# Patient Record
Sex: MALE | URBAN - METROPOLITAN AREA
[De-identification: names, ages, dates, MRNs, and addresses within clinical notes are randomized per-mention and may not be internally consistent; named-entity substitution may affect disease eponyms.]

---
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
## 2019-10-11 ENCOUNTER — OFFICE VISIT (OUTPATIENT)
Dept: RHEUMATOLOGY | Facility: CLINIC | Age: 19
End: 2019-10-11
Attending: PEDIATRICS
Payer: COMMERCIAL

## 2019-10-11 ENCOUNTER — HOSPITAL ENCOUNTER (OUTPATIENT)
Dept: GENERAL RADIOLOGY | Facility: CLINIC | Age: 19
End: 2019-10-11
Attending: PEDIATRICS
Payer: COMMERCIAL

## 2019-10-11 VITALS
DIASTOLIC BLOOD PRESSURE: 76 MMHG | SYSTOLIC BLOOD PRESSURE: 116 MMHG | HEIGHT: 68 IN | TEMPERATURE: 98.1 F | WEIGHT: 158.73 LBS | HEART RATE: 97 BPM | BODY MASS INDEX: 24.06 KG/M2

## 2019-10-11 DIAGNOSIS — M25.561 ACUTE PAIN OF BOTH KNEES: ICD-10-CM

## 2019-10-11 DIAGNOSIS — M25.562 ACUTE PAIN OF BOTH KNEES: ICD-10-CM

## 2019-10-11 DIAGNOSIS — M08.80 JIA (JUVENILE IDIOPATHIC ARTHRITIS) (H): Primary | ICD-10-CM

## 2019-10-11 DIAGNOSIS — M08.80 JIA (JUVENILE IDIOPATHIC ARTHRITIS) (H): ICD-10-CM

## 2019-10-11 PROCEDURE — 73560 X-RAY EXAM OF KNEE 1 OR 2: CPT | Mod: 50

## 2019-10-11 PROCEDURE — G0463 HOSPITAL OUTPT CLINIC VISIT: HCPCS | Mod: ZF

## 2019-10-11 ASSESSMENT — MIFFLIN-ST. JEOR: SCORE: 1711.25

## 2019-10-11 ASSESSMENT — PAIN SCALES - GENERAL: PAINLEVEL: NO PAIN (0)

## 2019-10-11 NOTE — PATIENT INSTRUCTIONS
"[facility] Pediatric Rheumatology      Dr. Fox    He may have an \"orthopedic\" problem is he has no swelling.     [phone]:  Listen for prompts: Rheumatology Nurse Coordinators:  Claudia Pacheco and Lina Dupree  can help with questions about your child s rheumatic condition, medications, and test results.      "

## 2019-10-11 NOTE — NURSING NOTE
"[facility] [[id number]]  Chief Complaint   Patient presents with     RECHECK     Rheum follow up     Initial /76   Pulse 97   Temp 98.1  F (36.7  C)   Ht 5' 8.11\" (173 cm)   Wt 158 lb 11.7 oz (72 kg)   BMI 24.06 kg/m   Estimated body mass index is 24.06 kg/m  as calculated from the following:    Height as of this encounter: 5' 8.11\" (173 cm).    Weight as of this encounter: 158 lb 11.7 oz (72 kg).  Medication Reconciliation: complete Julissa Corcoran LPN    "

## 2019-10-11 NOTE — PROGRESS NOTES
Patient Active Problem List   Diagnosis     JOEL (juvenile idiopathic arthritis) (H)          Subjective:     Robbie is a 18 year old male who was seen in Pediatric Rheumatology clinic today for a follow-up visit accompanied today by mother.  Robbie is being seen today for urgently requested follow-up for possible arthritis flare.  He was last seen in clinic January 2, 2018.  Prior to that he had been seen in October 2017 at which time he had steroid injections of his left elbow, right wrist and bilateral knees.  In the past his arthritis became under good control with only steroid injections and the family preferred no systemic medications.  At his follow-up visit in January 2018, approximately 20 months ago, his arthritis was clinically in remission.  I recommended follow-up if he has signs of arthritis again.    Today he tells me that he has pain in his knees, elbows and wrists.  His knees been hurting for 1 month generally all the time perhaps dating back 1 month prior to than that.  His elbows have on occasion but hard to move and his wrist hurts 1 time per week with certain movements.  He is noted no swelling in any of his joints.  He is limping because of his knee and that prompted the visit here.  He describes his pain at a level of 7 out of 10 in his knees with 30 minutes of stiffness most mornings and overall functioning at a level of 5 out of 10.  Review of 14 systems is negative other than noted above.      Allergies:     No Known Allergies       Medications:   None      Medical --  Family -- Social History:     Past Medical History:   Diagnosis Date     JOEL (juvenile idiopathic arthritis) (H)      Past Surgical History:   Procedure Laterality Date     INJECT STEROID (LOCATION) N/A 10/19/2017    Procedure: INJECT STEROID (LOCATION);  left elbow, right wrist, and bilateral knee injections. ;  Surgeon: Vi Fox MD;  Location: [facility]      right wrist cyst       No family history on  "file.  Social History     Patient does not qualify to have social determinant information on file (likely too young).   Social History Narrative    He is a  school for large equipment          Examination:     Blood pressure 116/76, pulse 97, temperature 98.1  F (36.7  C), height 1.73 m (5' 8.11\"), weight 72 kg (158 lb 11.7 oz).    Constitutional: alert, no distress and cooperative  Head and Eyes: No alopecia, PEERL, conjunctiva clear  ENT: mucous membranes moist, healthy appearing dentition, no intraoral ulcers and no intranasal ulcers  Neck: Neck supple. No lymphadenopathy. Thyroid symmetric, normal size,  Respiratory: negative, clear to auscultation  Cardiovascular: negative, RRR. No murmurs, no rubs  Gastrointestinal: Abdomen soft, non-tender., No masses, No hepatosplenomegaly  : Deferred  Neurologic: Gait normal. Reflexes normal and symmetric. Sensation grossly normal.  Psychiatric: mentation appears normal and affect normal  Hematologic/Lymphatic/Immunologic: Normal cervical, axillary lymph nodes  Skin: no rashes  Musculoskeletal: gait normal, extremities warm, well perfused, Detailed musculoskeletal exam was performed, normal muscle strength of trunk, upper and lower extremities and no sign of swelling, tenderness or decreased ROM unless otherwise noted. No tenderness at typical sites of enthesitis he may have a small effusion in the right knee but otherwise his examination is completely unremarkable. His gait is antalgic focused on the right leg.  He has no sign of ligamentous abnormalities on provocative testing.         Last Lab Results:     No visits with results within 2 Day(s) from this visit.   Latest known visit with results is:   Office Visit on 10/03/2017   Component Date Value     Lyme Disease Antibodies * 10/03/2017 0.03      WBC 10/03/2017 7.1      RBC Count 10/03/2017 5.10      Hemoglobin 10/03/2017 15.1      Hematocrit 10/03/2017 43.8      MCV 10/03/2017 86      MCH 10/03/2017 29.6  "     MCHC 10/03/2017 34.5      RDW 10/03/2017 12.1      Platelet Count 10/03/2017 166      Diff Method 10/03/2017 Automated Method      % Neutrophils 10/03/2017 57.3      % Lymphocytes 10/03/2017 31.0      % Monocytes 10/03/2017 6.5      % Eosinophils 10/03/2017 4.8      % Basophils 10/03/2017 0.3      % Immature Granulocytes 10/03/2017 0.1      Nucleated RBCs 10/03/2017 0      Absolute Neutrophil 10/03/2017 4.0      Absolute Lymphocytes 10/03/2017 2.2      Absolute Monocytes 10/03/2017 0.5      Absolute Eosinophils 10/03/2017 0.3      Absolute Basophils 10/03/2017 0.0      Abs Immature Granulocytes 10/03/2017 0.0      Absolute Nucleated RBC 10/03/2017 0.0      Sodium 10/03/2017 141      Potassium 10/03/2017 4.5      Chloride 10/03/2017 106      Carbon Dioxide 10/03/2017 29      Anion Gap 10/03/2017 6      Glucose 10/03/2017 88      Urea Nitrogen 10/03/2017 18      Creatinine 10/03/2017 0.89      GFR Estimate 10/03/2017 >90      GFR Estimate If Black 10/03/2017 >90      Calcium 10/03/2017 8.7*     Bilirubin Total 10/03/2017 0.3      Albumin 10/03/2017 4.0      Protein Total 10/03/2017 7.7      Alkaline Phosphatase 10/03/2017 69      ALT 10/03/2017 19      AST 10/03/2017 10      Rheumatoid Factor 10/03/2017 <20      Sed Rate 10/03/2017 3      TSH 10/03/2017 0.86           Assessment :      JOEL (juvenile idiopathic arthritis) (H)  Acute pain of both knees    Robbie has a long-standing history of juvenile idiopathic arthritis that has generally been responsive to steroid injections.  I am pleased to see that today he has no obvious evidence of synovitis accounting for his joint pains.  Since he has had his joint pains for over a month or so it should be reliable my examination should be reliable for arthritis since he had his discomfort for over a month and possibly longer.  I wonder about an anatomic abnormality such as an OCD lesion or cartilage lesion.  I recommend x-rays of his bilateral knees today.  If his  x-rays are normal I consider an MRI of his right knee given his limping and inability to function at work and school because of the pain.       Recommendations and follow-up:     1. X-rays of bilateral knees, consider MRI of right knee with contrast.  If symptoms worsen or swelling becomes more prominent I like to see him back for repeat examination.    2.   3. Return visit: No follow-ups on file.    If there are any new questions or concerns, I would be glad to help and can be reached through our main office at [phone] or our paging  at [phone].    Vi Fox MD, MS    I spent a total of 25  minutes face-to-face with Robbie Sexton during today's office visit.  Over 50% of this time was spent counseling the patient and/or coordinating care. See note for details.    CC  Patient Care Team:  Jez Garcia MD as PCP - General (Pediatrics)  Vi Fox MD (Pediatric Rheumatology)  Kory Del Valle LEONARD W    Copy to patient  Pretei SextonMed  [address] [facility] [address]

## 2019-10-11 NOTE — LETTER
10/11/2019      RE: Robbie Sexton  [id number] [facility] [address]       Patient Active Problem List   Diagnosis     JOEL (juvenile idiopathic arthritis) (H)          Subjective:     Robbie is a 18 year old male who was seen in Pediatric Rheumatology clinic today for a follow-up visit accompanied today by mother.  Robbie is being seen today for urgently requested follow-up for possible arthritis flare.  He was last seen in clinic January 2, 2018.  Prior to that he had been seen in October 2017 at which time he had steroid injections of his left elbow, right wrist and bilateral knees.  In the past his arthritis became under good control with only steroid injections and the family preferred no systemic medications.  At his follow-up visit in January 2018, approximately 20 months ago, his arthritis was clinically in remission.  I recommended follow-up if he has signs of arthritis again.    Today he tells me that he has pain in his knees, elbows and wrists.  His knees been hurting for 1 month generally all the time perhaps dating back 1 month prior to than that.  His elbows have on occasion but hard to move and his wrist hurts 1 time per week with certain movements.  He is noted no swelling in any of his joints.  He is limping because of his knee and that prompted the visit here.  He describes his pain at a level of 7 out of 10 in his knees with 30 minutes of stiffness most mornings and overall functioning at a level of 5 out of 10.  Review of 14 systems is negative other than noted above.      Allergies:     No Known Allergies       Medications:   None      Medical --  Family -- Social History:     Past Medical History:   Diagnosis Date     JOEL (juvenile idiopathic arthritis) (H)      Past Surgical History:   Procedure Laterality Date     INJECT STEROID (LOCATION) N/A 10/19/2017    Procedure: INJECT STEROID (LOCATION);  left elbow, right wrist, and bilateral knee injections. ;  Surgeon: Dominique  "Vi CHADWICK MD;  Location: [facility]      right wrist cyst       No family history on file.  Social History     Patient does not qualify to have social determinant information on file (likely too young).   Social History Narrative    He is a  school for large equipment          Examination:     Blood pressure 116/76, pulse 97, temperature 98.1  F (36.7  C), height 1.73 m (5' 8.11\"), weight 72 kg (158 lb 11.7 oz).    Constitutional: alert, no distress and cooperative  Head and Eyes: No alopecia, PEERL, conjunctiva clear  ENT: mucous membranes moist, healthy appearing dentition, no intraoral ulcers and no intranasal ulcers  Neck: Neck supple. No lymphadenopathy. Thyroid symmetric, normal size,  Respiratory: negative, clear to auscultation  Cardiovascular: negative, RRR. No murmurs, no rubs  Gastrointestinal: Abdomen soft, non-tender., No masses, No hepatosplenomegaly  : Deferred  Neurologic: Gait normal. Reflexes normal and symmetric. Sensation grossly normal.  Psychiatric: mentation appears normal and affect normal  Hematologic/Lymphatic/Immunologic: Normal cervical, axillary lymph nodes  Skin: no rashes  Musculoskeletal: gait normal, extremities warm, well perfused, Detailed musculoskeletal exam was performed, normal muscle strength of trunk, upper and lower extremities and no sign of swelling, tenderness or decreased ROM unless otherwise noted. No tenderness at typical sites of enthesitis he may have a small effusion in the right knee but otherwise his examination is completely unremarkable. His gait is antalgic focused on the right leg.  He has no sign of ligamentous abnormalities on provocative testing.         Last Lab Results:     No visits with results within 2 Day(s) from this visit.   Latest known visit with results is:   Office Visit on 10/03/2017   Component Date Value     Lyme Disease Antibodies * 10/03/2017 0.03      WBC 10/03/2017 7.1      RBC Count 10/03/2017 5.10      Hemoglobin " 10/03/2017 15.1      Hematocrit 10/03/2017 43.8      MCV 10/03/2017 86      MCH 10/03/2017 29.6      MCHC 10/03/2017 34.5      RDW 10/03/2017 12.1      Platelet Count 10/03/2017 166      Diff Method 10/03/2017 Automated Method      % Neutrophils 10/03/2017 57.3      % Lymphocytes 10/03/2017 31.0      % Monocytes 10/03/2017 6.5      % Eosinophils 10/03/2017 4.8      % Basophils 10/03/2017 0.3      % Immature Granulocytes 10/03/2017 0.1      Nucleated RBCs 10/03/2017 0      Absolute Neutrophil 10/03/2017 4.0      Absolute Lymphocytes 10/03/2017 2.2      Absolute Monocytes 10/03/2017 0.5      Absolute Eosinophils 10/03/2017 0.3      Absolute Basophils 10/03/2017 0.0      Abs Immature Granulocytes 10/03/2017 0.0      Absolute Nucleated RBC 10/03/2017 0.0      Sodium 10/03/2017 141      Potassium 10/03/2017 4.5      Chloride 10/03/2017 106      Carbon Dioxide 10/03/2017 29      Anion Gap 10/03/2017 6      Glucose 10/03/2017 88      Urea Nitrogen 10/03/2017 18      Creatinine 10/03/2017 0.89      GFR Estimate 10/03/2017 >90      GFR Estimate If Black 10/03/2017 >90      Calcium 10/03/2017 8.7*     Bilirubin Total 10/03/2017 0.3      Albumin 10/03/2017 4.0      Protein Total 10/03/2017 7.7      Alkaline Phosphatase 10/03/2017 69      ALT 10/03/2017 19      AST 10/03/2017 10      Rheumatoid Factor 10/03/2017 <20      Sed Rate 10/03/2017 3      TSH 10/03/2017 0.86           Assessment :      JOEL (juvenile idiopathic arthritis) (H)  Acute pain of both knees    Robbie has a long-standing history of juvenile idiopathic arthritis that has generally been responsive to steroid injections.  I am pleased to see that today he has no obvious evidence of synovitis accounting for his joint pains.  Since he has had his joint pains for over a month or so it should be reliable my examination should be reliable for arthritis since he had his discomfort for over a month and possibly longer.  I wonder about an anatomic abnormality such as  an OCD lesion or cartilage lesion.  I recommend x-rays of his bilateral knees today.  If his x-rays are normal I consider an MRI of his right knee given his limping and inability to function at work and school because of the pain.       Recommendations and follow-up:     1. X-rays of bilateral knees, consider MRI of right knee with contrast.  If symptoms worsen or swelling becomes more prominent I like to see him back for repeat examination.    2.   3. Return visit: No follow-ups on file.    If there are any new questions or concerns, I would be glad to help and can be reached through our main office at [phone] or our paging  at [phone].    Vi Fox MD, MS    I spent a total of 25  minutes face-to-face with Robbie Sexton during today's office visit.  Over 50% of this time was spent counseling the patient and/or coordinating care. See note for details.    CC  Patient Care Team:  Jez Garcia MD as PCP - General (Pediatrics)  Vi Fox MD (Pediatric Rheumatology)  Kory Del Valle A      Copy to patient    Parent(s) of Robbie Sexton  [address] [facility] [id number]

## 2019-10-14 ENCOUNTER — TELEPHONE (OUTPATIENT)
Dept: RHEUMATOLOGY | Facility: CLINIC | Age: 19
End: 2019-10-14

## 2019-10-14 DIAGNOSIS — M08.80 JIA (JUVENILE IDIOPATHIC ARTHRITIS) (H): Primary | ICD-10-CM

## 2019-10-14 DIAGNOSIS — M25.561 ACUTE PAIN OF BOTH KNEES: ICD-10-CM

## 2019-10-14 DIAGNOSIS — M25.562 ACUTE PAIN OF BOTH KNEES: ICD-10-CM

## 2019-10-14 NOTE — TELEPHONE ENCOUNTER
We discussed X-ray findings and that Dr. Fox would like MRI of one knee done. He says both knees are bad, but he thinks the left knee may be worse. I gave him the number to schedule and asked that she check with his insurance to make sure it will be covered. He has no other questions at this time.

## 2019-10-18 ENCOUNTER — HOSPITAL ENCOUNTER (OUTPATIENT)
Dept: MRI IMAGING | Facility: CLINIC | Age: 19
Discharge: HOME | End: 2019-10-18
Attending: PEDIATRICS | Admitting: PEDIATRICS
Payer: COMMERCIAL

## 2019-10-18 DIAGNOSIS — M25.561 ACUTE PAIN OF BOTH KNEES: ICD-10-CM

## 2019-10-18 DIAGNOSIS — M08.80 JIA (JUVENILE IDIOPATHIC ARTHRITIS) (H): ICD-10-CM

## 2019-10-18 DIAGNOSIS — M25.562 ACUTE PAIN OF BOTH KNEES: ICD-10-CM

## 2019-10-18 PROCEDURE — 73721 MRI JNT OF LWR EXTRE W/O DYE: CPT | Mod: LT

## 2019-10-22 ENCOUNTER — TELEPHONE (OUTPATIENT)
Dept: RHEUMATOLOGY | Facility: CLINIC | Age: 19
End: 2019-10-22

## 2019-10-22 NOTE — TELEPHONE ENCOUNTER
Spoke to Blake to inform him of his normal MRI. See lab letter. He will follow up with PCP if pain persists.

## 2020-03-10 ENCOUNTER — HEALTH MAINTENANCE LETTER (OUTPATIENT)
Age: 20
End: 2020-03-10

## 2020-04-20 ENCOUNTER — SURGERY - HEALTHEAST (OUTPATIENT)
Dept: SURGERY | Facility: HOSPITAL | Age: 20
End: 2020-04-20

## 2020-04-20 ENCOUNTER — ANESTHESIA - HEALTHEAST (OUTPATIENT)
Dept: SURGERY | Facility: HOSPITAL | Age: 20
End: 2020-04-20

## 2020-04-20 ASSESSMENT — MIFFLIN-ST. JEOR: SCORE: 1750.61

## 2020-12-27 ENCOUNTER — HEALTH MAINTENANCE LETTER (OUTPATIENT)
Age: 20
End: 2020-12-27

## 2021-04-24 ENCOUNTER — HEALTH MAINTENANCE LETTER (OUTPATIENT)
Age: 21
End: 2021-04-24

## 2021-06-04 VITALS — HEIGHT: 68 IN | WEIGHT: 170 LBS | BODY MASS INDEX: 25.76 KG/M2

## 2021-06-07 NOTE — ANESTHESIA PREPROCEDURE EVALUATION
Anesthesia Evaluation      Patient summary reviewed   No history of anesthetic complications     Airway   Mallampati: I  Neck ROM: full   Pulmonary - negative ROS and normal exam                          Cardiovascular - negative ROS and normal exam   Neuro/Psych - negative ROS     Endo/Other - negative ROS      GI/Hepatic/Renal - negative ROS      Other findings: Distal finger puncture injury  Last ate at 1400      Dental - normal exam                        Anesthesia Plan  Planned anesthetic: MAC    ASA 1 - emergent     Anesthetic plan and risks discussed with: patient    Post-op plan: routine recovery

## 2021-06-07 NOTE — ANESTHESIA CARE TRANSFER NOTE
Last vitals:   Vitals:    04/21/20 0115   BP: 127/58   Pulse: (!) 58   Resp:    Temp:    SpO2: 100%     Patient's level of consciousness is awake  Spontaneous respirations: yes  Maintains airway independently: yes  Dentition unchanged: yes  Oropharynx: oropharynx clear of all foreign objects    QCDR Measures:  ASA# 20 - Surgical Safety Checklist: WHO surgical safety checklist completed prior to induction    PQRS# 430 - Adult PONV Prevention: 4558F - Pt received => 2 anti-emetic agents (different classes) preop & intraop  ASA# 8 - Peds PONV Prevention: NA - Not pediatric patient, not GA or 2 or more risk factors NOT present  PQRS# 424 - Ana Paula-op Temp Management: 4559F - At least one body temp DOCUMENTED => 35.5C or 95.9F within required timeframe  PQRS# 426 - PACU Transfer Protocol: - Transfer of care checklist used  ASA# 14 - Acute Post-op Pain: ASA14B - Patient did NOT experience pain >= 7 out of 10

## 2021-06-07 NOTE — ANESTHESIA POSTPROCEDURE EVALUATION
Patient: Robbie Sexton  Procedure(s):  INCISION AND DRAINAGE, SECOND DISTAL FINGER (Left)  Anesthesia type: MAC    Patient location: PACU  Last vitals:   Vitals Value   /60   Temp 36.2  C (97.2  F)   Pulse 63   SpO2 99 %   Vitals shown include unvalidated device data.  Post vital signs: stable  Level of consciousness: awake and responds to simple questions  Post-anesthesia pain: pain controlled  Post-anesthesia nausea and vomiting: no  Pulmonary: unassisted, return to baseline  Cardiovascular: stable and blood pressure at baseline  Hydration: adequate  Anesthetic events: no    QCDR Measures:  ASA# 11 - Ana Paula-op Cardiac Arrest: ASA11B - Patient did NOT experience unanticipated cardiac arrest  ASA# 12 - [place] Paula-op Mortality Rate: ASA12B - Patient did NOT die  ASA# 13 - PACU Re-Intubation Rate: NA - No ETT / LMA used for case  ASA# 10 - Composite Anes Safety: ASA10A - No serious adverse event    Additional Notes:

## 2021-10-09 ENCOUNTER — HEALTH MAINTENANCE LETTER (OUTPATIENT)
Age: 21
End: 2021-10-09

## 2022-05-10 DIAGNOSIS — M08.80 JIA (JUVENILE IDIOPATHIC ARTHRITIS) (H): Primary | ICD-10-CM

## 2022-05-16 ENCOUNTER — HEALTH MAINTENANCE LETTER (OUTPATIENT)
Age: 22
End: 2022-05-16

## 2022-09-11 ENCOUNTER — HEALTH MAINTENANCE LETTER (OUTPATIENT)
Age: 22
End: 2022-09-11

## 2023-06-03 ENCOUNTER — HEALTH MAINTENANCE LETTER (OUTPATIENT)
Age: 23
End: 2023-06-03

## (undated) DEVICE — DRSG GAUZE 4X4" TRAY 6939

## (undated) DEVICE — PREP PAD ALCOHOL 6818

## (undated) DEVICE — GLOVE PROTEXIS W/NEU-THERA 7.0  2D73TE70

## (undated) DEVICE — NDL BLUNT 18GA 1" W/O FILTER 305181

## (undated) DEVICE — PREP CHLORAPREP CLEAR 3ML 260400

## (undated) DEVICE — DRAPE STERI APERATURE 15X15" 1020

## (undated) DEVICE — SYR 03ML LL W/O NDL 309657

## (undated) DEVICE — NDL 21GA 1.5"

## (undated) DEVICE — DRAPE TOWEL POLY 18X26"

## (undated) RX ORDER — FENTANYL CITRATE 50 UG/ML
INJECTION, SOLUTION INTRAMUSCULAR; INTRAVENOUS
Status: DISPENSED
Start: 2017-10-19

## (undated) RX ORDER — ONDANSETRON 2 MG/ML
INJECTION INTRAMUSCULAR; INTRAVENOUS
Status: DISPENSED
Start: 2017-10-19